# Patient Record
Sex: FEMALE | Race: ASIAN | NOT HISPANIC OR LATINO
[De-identification: names, ages, dates, MRNs, and addresses within clinical notes are randomized per-mention and may not be internally consistent; named-entity substitution may affect disease eponyms.]

---

## 2022-10-10 PROBLEM — Z00.00 ENCOUNTER FOR PREVENTIVE HEALTH EXAMINATION: Status: ACTIVE | Noted: 2022-10-10

## 2022-10-12 PROBLEM — Z87.39 HISTORY OF ARTHRITIS: Status: RESOLVED | Noted: 2022-10-12 | Resolved: 2022-10-12

## 2022-10-12 PROBLEM — Z87.898 HISTORY OF PREDIABETES: Status: RESOLVED | Noted: 2022-10-12 | Resolved: 2022-10-12

## 2022-10-12 PROBLEM — U07.1 COVID-19: Status: RESOLVED | Noted: 2022-10-12 | Resolved: 2022-10-12

## 2022-10-14 ENCOUNTER — APPOINTMENT (OUTPATIENT)
Dept: THORACIC SURGERY | Facility: CLINIC | Age: 62
End: 2022-10-14

## 2022-10-14 VITALS
TEMPERATURE: 97.3 F | RESPIRATION RATE: 16 BRPM | HEART RATE: 84 BPM | DIASTOLIC BLOOD PRESSURE: 81 MMHG | SYSTOLIC BLOOD PRESSURE: 127 MMHG | HEIGHT: 66.93 IN | WEIGHT: 163 LBS | BODY MASS INDEX: 25.58 KG/M2 | OXYGEN SATURATION: 96 %

## 2022-10-14 DIAGNOSIS — Z83.3 FAMILY HISTORY OF DIABETES MELLITUS: ICD-10-CM

## 2022-10-14 DIAGNOSIS — Z87.39 PERSONAL HISTORY OF OTHER DISEASES OF THE MUSCULOSKELETAL SYSTEM AND CONNECTIVE TISSUE: ICD-10-CM

## 2022-10-14 DIAGNOSIS — Z87.898 PERSONAL HISTORY OF OTHER SPECIFIED CONDITIONS: ICD-10-CM

## 2022-10-14 DIAGNOSIS — U07.1 COVID-19: ICD-10-CM

## 2022-10-14 PROCEDURE — 99203 OFFICE O/P NEW LOW 30 MIN: CPT

## 2022-10-14 NOTE — PHYSICAL EXAM
[General Appearance - Alert] : alert [General Appearance - In No Acute Distress] : in no acute distress [Outer Ear] : the ears and nose were normal in appearance [Oropharynx] : the oropharynx was normal [Neck Appearance] : the appearance of the neck was normal [Neck Cervical Mass (___cm)] : no neck mass was observed [Jugular Venous Distention Increased] : there was no jugular-venous distention [Thyroid Diffuse Enlargement] : the thyroid was not enlarged [Thyroid Nodule] : there were no palpable thyroid nodules [] : no respiratory distress [Auscultation Breath Sounds / Voice Sounds] : lungs were clear to auscultation bilaterally [Heart Rate And Rhythm] : heart rate was normal and rhythm regular [Heart Sounds] : normal S1 and S2 [Heart Sounds Gallop] : no gallops [Murmurs] : no murmurs [Heart Sounds Pericardial Friction Rub] : no pericardial rub [Deep Tendon Reflexes (DTR)] : deep tendon reflexes were 2+ and symmetric [Sensation] : the sensory exam was normal to light touch and pinprick [No Focal Deficits] : no focal deficits [Oriented To Time, Place, And Person] : oriented to person, place, and time [Impaired Insight] : insight and judgment were intact [Affect] : the affect was normal

## 2023-01-26 NOTE — CONSULT LETTER
[Dear  ___] : Dear  [unfilled], [Consult Letter:] : I had the pleasure of evaluating your patient, [unfilled]. [Please see my note below.] : Please see my note below. [Consult Closing:] : Thank you very much for allowing me to participate in the care of this patient.  If you have any questions, please do not hesitate to contact me. [Sincerely,] : Sincerely, [FreeTextEntry2] : Zuly Reilly [FreeTextEntry3] : Papo Bose MD\par Professor, Cardiovascular & Thoracic Surgery\par [employer]\par Director of [facility] \par Director of Thoracic Surgery, [facility]\par \par [facility]\par [address]\par [facility] 4th Floor\par [place] [address]\par Phone: [phone]\par Fax: [phone]\par \par \par

## 2023-01-26 NOTE — HISTORY OF PRESENT ILLNESS
[FreeTextEntry1] : MEGHAN BALTAZAR is a 62 year old F, Mandarin speaking, nonsmoker, with PMHx of preDM, positive QFT-G, COVID infection, who is referred by Zuly Reilly for an initial evaluation of multiple pulmonary nodules, found on the work up to rule out active TB. Pt was tested QFT-G positive on 09/15/2022. She reports feeling well in general. No unintentional weight loss, headache, anorexia, fatigue, night sweats, persist cough, chest pain, abd pain, dyspnea, or hemoptysis.\par \par CT chest on 10/10/2022\par - Bilateral lung nodules including ground-glass nodules.  \par  -- 5 mm right upper lobe.\par  -- 2 mm right perifissural. \par  -- 4 mm ground-glass nodule right lower lobe.\par  -- 3 mm ground-glass nodule in the anterior left apex.\par  -- 3 mm ground-glass nodule in the lingula.\par  -- 4 mm ground-glass nodule in the left lower lobe.\par \par

## 2023-01-26 NOTE — ASSESSMENT
[FreeTextEntry1] : 62 year old F, Mandarin speaking, nonsmoker, with PMHx of preDM, positive QFT-G, COVID infection, who is referred by Zuly Reilly for an initial evaluation of multiple pulmonary nodules. \par \par Patient reported no unintentional weight loss, headache, anorexia, fatigue, night sweats, persist cough, chest pain, dyspnea, or hemoptysis. CT chest on 10/10/2022 was reviewed, with evidence of multiple bilateral subcentimeter pulmonary nodules, mostly benign related to remote infection/inflammation process. I recommend to follow up with a CT chest in 6 months, then one year stable. \par \par Plan:\par CT chest without contrast in 6 months. \par \par Kady CAMPOS FNP, am scribing for and the presence of Dr. JAMEE STOCK the following sections: history of present illness, past medical/family/surgical/family/social history, review of systems, vital signs, physical exam, and disposition.\par \par JAMEE CAMPOS, personally performed the services described in the documentation, reviewed the documentation recorded by the scribe in my presence and it accurately and completely records my words and actions.\par \par \par \par

## 2023-04-14 ENCOUNTER — APPOINTMENT (OUTPATIENT)
Dept: THORACIC SURGERY | Facility: CLINIC | Age: 63
End: 2023-04-14
Payer: MEDICAID

## 2023-04-21 ENCOUNTER — APPOINTMENT (OUTPATIENT)
Dept: THORACIC SURGERY | Facility: CLINIC | Age: 63
End: 2023-04-21
Payer: MEDICAID

## 2023-04-21 VITALS
SYSTOLIC BLOOD PRESSURE: 116 MMHG | BODY MASS INDEX: 25.74 KG/M2 | HEIGHT: 66.93 IN | TEMPERATURE: 97.8 F | HEART RATE: 68 BPM | WEIGHT: 164 LBS | OXYGEN SATURATION: 99 % | DIASTOLIC BLOOD PRESSURE: 76 MMHG | RESPIRATION RATE: 18 BRPM

## 2023-04-21 DIAGNOSIS — R76.12 NONSPECIFIC REACTION TO CELL MEDIATED IMMUNITY MEASUREMENT OF GAMMA INTERFERON ANTIGEN RESPONSE W/OUT ACTIVE TUBERCULOSIS: ICD-10-CM

## 2023-04-21 DIAGNOSIS — R91.8 OTHER NONSPECIFIC ABNORMAL FINDING OF LUNG FIELD: ICD-10-CM

## 2023-04-21 PROCEDURE — 99213 OFFICE O/P EST LOW 20 MIN: CPT

## 2023-04-24 PROBLEM — R91.8 MULTIPLE LUNG NODULES: Status: ACTIVE | Noted: 2022-10-12

## 2023-04-24 PROBLEM — R76.12 POSITIVE QUANTIFERON-TB GOLD TEST: Status: ACTIVE | Noted: 2022-10-12

## 2023-04-24 NOTE — PHYSICAL EXAM
[Examination Of The Chest] : the chest was normal in appearance [Chest Visual Inspection Thoracic Asymmetry] : no chest asymmetry [Diminished Respiratory Excursion] : normal chest expansion [Abnormal Walk] : normal gait [Nail Clubbing] : no clubbing  or cyanosis of the fingernails [Musculoskeletal - Swelling] : no joint swelling seen [Motor Tone] : muscle strength and tone were normal [Impaired Insight] : insight and judgment were intact [Oriented To Time, Place, And Person] : oriented to person, place, and time [Affect] : the affect was normal

## 2023-04-25 NOTE — HISTORY OF PRESENT ILLNESS
[FreeTextEntry1] : MEGHAN BALTAZAR is a 62 year old F, Mandarin speaking, nonsmoker, with PMHx of preDM, positive QFT-G, COVID infection, who was initially referred by Zuly Reilly for initial evaluation of multiple pulmonary nodules. She presents today for a 6 month follow up visit and repeat diagnostic testing. \par \par \par CT chest noncontrast 4/10/23:\par 5 mm right upper lobe nodule, similar (series 3, image 46).\par 2 mm right upper lobe posterior subpleural nodule, in retrospect similar to prior study (series 3, image 28).\par 2 mm right middle lobe perifissural nodule, similar (series 3, image 51).\par 4 mm right lower lobe ground-glass nodule, similar (series 3, image 69).\par 3 mm left apex ground-glass nodule, similar (series 3, image 18).\par 3 mm left upper lobe anterior ground-glass nodule in retrospect similar to prior study (series 3, image 51).\par 3 mm ground-glass nodule in the lingula, similar (series 3, image 55).\par 4 mm left lower lobe ground-glass nodule, similar (series 3, image 78).\par No mass or consolidation.  The trachea and central airways are patent.\par \par \par 1. Similar appearance of ground-glass nodules measuring up to 5 mm and pulmonary nodules measuring up to 2 mm.  Twenty-four month follow-up noncontrast chest CT is recommended.\par 2.In retrospect, similar appearance of a 0.5 x 0.5 x 0.9 cm calcified structure in the left posterior aspect of the spinal canal at the level of T8-T9 which could represent either a calcified meningioma or ossification of the ligamentum flavum with associated hypertrophic change.  This can be further characterized with thoracic spine MRI with without IV contrast if clinically desired. \par

## 2023-04-25 NOTE — DATA REVIEWED
[FreeTextEntry1] : CT chest on 10/10/2022\par - Bilateral lung nodules including ground-glass nodules. \par  -- 5 mm right upper lobe.\par  -- 2 mm right perifissural. \par  -- 4 mm ground-glass nodule right lower lobe.\par  -- 3 mm ground-glass nodule in the anterior left apex.\par  -- 3 mm ground-glass nodule in the lingula.\par  -- 4 mm ground-glass nodule in the left lower lobe.\par \par

## 2023-04-25 NOTE — ASSESSMENT
[FreeTextEntry1] : Patient is a 62 year old F, Mandarin speaking, nonsmoker, with PMHx of preDM, positive QFT-G, COVID infection, who was initially referred by Zuly Reilly for initial evaluation of multiple pulmonary nodules.\par \par  She presents today for a 6 month follow up visit and repeat diagnostic testing. Reports she is feeling well without breathing complaints. She is currently on isoniazid 300 mg for treatment of latent TB. \par \par Her medical record and images reviewed today. CT chest on 04/10/23 revealed similar appearance of ground-glass nodules measuring up to 5 mm and pulmonary nodules measuring up to 2 mm. These are small nodules and stable on scans. At this time, I think the best course of action is to repeat a CT scan in two year  to ensure stability of the nodule. The patient agreed.\par \par Plan:\par 1.CT chest in 2 years.\par 2. Follow up in 2 years\par \par \par Zan CAMPOS RN, am scribing for and the presence of Dr. JAMEE STOCK the following sections: history of present illness, past medical/family/surgical/family/social history, review of systems, vital signs, physical exam, and disposition.\par \par JAMEE CAMPOS, personally performed the services described in the documentation, reviewed the documentation recorded by the scribe in my presence and it accurately and completely records my words and actions.

## 2024-04-26 ENCOUNTER — APPOINTMENT (OUTPATIENT)
Dept: THORACIC SURGERY | Facility: CLINIC | Age: 64
End: 2024-04-26

## 2025-04-16 ENCOUNTER — APPOINTMENT (OUTPATIENT)
Dept: THORACIC SURGERY | Facility: CLINIC | Age: 65
End: 2025-04-16
Payer: MEDICAID

## 2025-04-16 ENCOUNTER — NON-APPOINTMENT (OUTPATIENT)
Age: 65
End: 2025-04-16

## 2025-04-16 DIAGNOSIS — R91.8 OTHER NONSPECIFIC ABNORMAL FINDING OF LUNG FIELD: ICD-10-CM

## 2025-04-16 PROCEDURE — 99212 OFFICE O/P EST SF 10 MIN: CPT | Mod: 93
